# Patient Record
Sex: FEMALE | Race: WHITE | NOT HISPANIC OR LATINO | Employment: FULL TIME | URBAN - METROPOLITAN AREA
[De-identification: names, ages, dates, MRNs, and addresses within clinical notes are randomized per-mention and may not be internally consistent; named-entity substitution may affect disease eponyms.]

---

## 2017-11-07 ENCOUNTER — LAB (OUTPATIENT)
Dept: LAB | Facility: CLINIC | Age: 62
End: 2017-11-07
Payer: COMMERCIAL

## 2017-11-07 ENCOUNTER — TRANSCRIBE ORDERS (OUTPATIENT)
Dept: LAB | Facility: CLINIC | Age: 62
End: 2017-11-07

## 2017-11-07 DIAGNOSIS — E11.8 TYPE 2 DIABETES MELLITUS WITH COMPLICATION, WITHOUT LONG-TERM CURRENT USE OF INSULIN (HCC): Primary | ICD-10-CM

## 2017-11-07 LAB
CREAT UR-MCNC: 42.4 MG/DL
EST. AVERAGE GLUCOSE BLD GHB EST-MCNC: 151 MG/DL
HBA1C MFR BLD: 6.9 % (ref 4.2–6.3)
MICROALBUMIN UR-MCNC: <5 MG/L (ref 0–20)
MICROALBUMIN/CREAT 24H UR: <12 MG/G CREATININE (ref 0–30)

## 2017-11-07 PROCEDURE — 82570 ASSAY OF URINE CREATININE: CPT

## 2017-11-07 PROCEDURE — 82043 UR ALBUMIN QUANTITATIVE: CPT

## 2017-11-07 PROCEDURE — 36415 COLL VENOUS BLD VENIPUNCTURE: CPT

## 2017-11-07 PROCEDURE — 83036 HEMOGLOBIN GLYCOSYLATED A1C: CPT

## 2018-05-17 ENCOUNTER — APPOINTMENT (OUTPATIENT)
Dept: LAB | Facility: CLINIC | Age: 63
End: 2018-05-17
Payer: COMMERCIAL

## 2018-05-17 ENCOUNTER — TRANSCRIBE ORDERS (OUTPATIENT)
Dept: LAB | Facility: CLINIC | Age: 63
End: 2018-05-17

## 2018-05-17 DIAGNOSIS — E11.9 DIABETES MELLITUS, STABLE (HCC): Primary | ICD-10-CM

## 2018-05-17 LAB
ANION GAP SERPL CALCULATED.3IONS-SCNC: 6 MMOL/L (ref 4–13)
BUN SERPL-MCNC: 14 MG/DL (ref 5–25)
CALCIUM SERPL-MCNC: 8.9 MG/DL (ref 8.3–10.1)
CHLORIDE SERPL-SCNC: 107 MMOL/L (ref 100–108)
CO2 SERPL-SCNC: 28 MMOL/L (ref 21–32)
CREAT SERPL-MCNC: 0.87 MG/DL (ref 0.6–1.3)
EST. AVERAGE GLUCOSE BLD GHB EST-MCNC: 154 MG/DL
GFR SERPL CREATININE-BSD FRML MDRD: 71 ML/MIN/1.73SQ M
GLUCOSE P FAST SERPL-MCNC: 101 MG/DL (ref 65–99)
HBA1C MFR BLD: 7 % (ref 4.2–6.3)
POTASSIUM SERPL-SCNC: 4.2 MMOL/L (ref 3.5–5.3)
SODIUM SERPL-SCNC: 141 MMOL/L (ref 136–145)

## 2018-05-17 PROCEDURE — 80048 BASIC METABOLIC PNL TOTAL CA: CPT

## 2018-05-17 PROCEDURE — 83036 HEMOGLOBIN GLYCOSYLATED A1C: CPT

## 2018-05-17 PROCEDURE — 36415 COLL VENOUS BLD VENIPUNCTURE: CPT

## 2021-04-08 DIAGNOSIS — Z23 ENCOUNTER FOR IMMUNIZATION: ICD-10-CM

## 2021-12-14 ENCOUNTER — EVALUATION (OUTPATIENT)
Dept: PHYSICAL THERAPY | Facility: REHABILITATION | Age: 66
End: 2021-12-14
Payer: COMMERCIAL

## 2021-12-14 DIAGNOSIS — M54.16 LUMBAR RADICULOPATHY: Primary | ICD-10-CM

## 2021-12-14 PROCEDURE — 97161 PT EVAL LOW COMPLEX 20 MIN: CPT | Performed by: PHYSICAL THERAPIST

## 2021-12-14 RX ORDER — METOPROLOL SUCCINATE 100 MG/1
100 TABLET, EXTENDED RELEASE ORAL DAILY
COMMUNITY

## 2021-12-14 RX ORDER — GLYBURIDE 1.25 MG/1
1.25 TABLET ORAL
COMMUNITY

## 2021-12-22 ENCOUNTER — OFFICE VISIT (OUTPATIENT)
Dept: PHYSICAL THERAPY | Facility: REHABILITATION | Age: 66
End: 2021-12-22
Payer: COMMERCIAL

## 2021-12-22 DIAGNOSIS — M54.16 LUMBAR RADICULOPATHY: Primary | ICD-10-CM

## 2021-12-22 PROCEDURE — 97140 MANUAL THERAPY 1/> REGIONS: CPT | Performed by: PHYSICAL THERAPIST

## 2021-12-22 PROCEDURE — 97112 NEUROMUSCULAR REEDUCATION: CPT | Performed by: PHYSICAL THERAPIST

## 2021-12-22 PROCEDURE — 97110 THERAPEUTIC EXERCISES: CPT | Performed by: PHYSICAL THERAPIST

## 2021-12-30 ENCOUNTER — APPOINTMENT (OUTPATIENT)
Dept: PHYSICAL THERAPY | Facility: REHABILITATION | Age: 66
End: 2021-12-30
Payer: COMMERCIAL

## 2022-01-04 ENCOUNTER — OFFICE VISIT (OUTPATIENT)
Dept: PHYSICAL THERAPY | Facility: REHABILITATION | Age: 67
End: 2022-01-04
Payer: COMMERCIAL

## 2022-01-04 DIAGNOSIS — M54.16 LUMBAR RADICULOPATHY: Primary | ICD-10-CM

## 2022-01-04 PROCEDURE — 97140 MANUAL THERAPY 1/> REGIONS: CPT | Performed by: PHYSICAL THERAPIST

## 2022-01-04 PROCEDURE — 97112 NEUROMUSCULAR REEDUCATION: CPT | Performed by: PHYSICAL THERAPIST

## 2022-01-04 NOTE — PROGRESS NOTES
Daily Note     Today's date: 2022  Patient name: Michelle Platt  : 1955  MRN: [id number]  Referring provider: Saravanan Soliman DO  Dx:   Encounter Diagnosis     ICD-10-CM    1  Lumbar radiculopathy  M54 16        Start Time:   Stop Time:   Total time in clinic (min): 47 minutes    Subjective: Pt has been doing her stretches 3x/day and she is being more mindful of her position of her feet in relation to her back when moving  She feels like she is not very flexible  Pt reports one instance of symptoms down her leg when walking to her car after sitting at work  Her symptoms lasted 3 mins then went away  Pt reports some back pain yesterday and today  It is rated 2/10  Objective: See treatment diary below      Assessment: Tolerated treatment well  Patient exhibited good technique with therapeutic exercises  Pt presents with improved control with TAC during LE strengthening exercises  Pt also presents with decreased palpable muscle spasm throughout L superior glute duirng STM  Plan: Continue per plan of care              Precautions: Diabetes type 2, HTN  Daily Treatment Diary    Manuals        L piriformis/superior glute STM nv 10 mins 10 mins                                      Neuro Re-Ed          SLS           Tandem Stance          TAC  6 mins 10"x3       TAC + ball squeeze  5"x10 5"x10       TAC +alt knee fall out  x10 ea x10 ea       TAC + SLR nv  x10 ea       TAC + s/l ABD nv  x10 ea       TAC + bridge nv  x8 ea       TAC + prone heel squeeze          Ther Ex          Bike          Seated piri stretch 10"x1 ea 10"x3 ea HEP       Seated HS stretch 10"x1 ea 10"x3 ea HEP       Supine piri stretch 10"x1 ea 10"x3 ea HEP       LTR 10"x1 ea 5"x5 ea HEP       Standing hip ABD          Standing SLR          Standing march          HR/TR                              Ther Activity          FSU and over                     Gait Training                              Modalities

## 2022-01-06 ENCOUNTER — APPOINTMENT (OUTPATIENT)
Dept: PHYSICAL THERAPY | Facility: REHABILITATION | Age: 67
End: 2022-01-06
Payer: COMMERCIAL

## 2022-01-11 ENCOUNTER — APPOINTMENT (OUTPATIENT)
Dept: PHYSICAL THERAPY | Facility: REHABILITATION | Age: 67
End: 2022-01-11
Payer: COMMERCIAL

## 2022-01-14 ENCOUNTER — OFFICE VISIT (OUTPATIENT)
Dept: PHYSICAL THERAPY | Facility: REHABILITATION | Age: 67
End: 2022-01-14
Payer: COMMERCIAL

## 2022-01-14 DIAGNOSIS — M54.16 LUMBAR RADICULOPATHY: Primary | ICD-10-CM

## 2022-01-14 PROCEDURE — 97110 THERAPEUTIC EXERCISES: CPT | Performed by: PHYSICAL THERAPIST

## 2022-01-14 PROCEDURE — 97112 NEUROMUSCULAR REEDUCATION: CPT | Performed by: PHYSICAL THERAPIST

## 2022-01-14 NOTE — PROGRESS NOTES
Daily Note     Today's date: 2022  Patient name: Michelle Platt  : 1955  MRN: [id number]  Referring provider: Saravanan Soliman DO  Dx:   Encounter Diagnosis     ICD-10-CM    1  Lumbar radiculopathy  M54 16        Start Time:   Stop Time: 33  Total time in clinic (min): 47 minutes    Subjective: Pt reports she has has some back pain today  She had pain this morning, was able to do 1 5 mile walk without increase in pain  Pt reports yesterday she was working yesterday and was doing some more bending over, Pt had one day of symptoms down her LLE last week, lasted 6 mins, 2 episodes spaced 30 mins apart  She reports it started at the ankle and went to her hip  She felt like she had difficulty keeping her balance  She was walking when this happened  Back pain is rated 2/10 with no symptoms down her leg  Objective: See treatment diary below      Assessment: Tolerated treatment well  Patient demonstrated fatigue post treatment  Required increased UE support during standing hip 4 way when standing on LLE  Pt also required cueing for neutral standing posture with TAC during standing hip 4 way, pt most challenged when weightbearing on RLE requiring increased UE support to maintain balance and hip stability  Plan: Continue per plan of care              Precautions: Diabetes type 2, HTN  Daily Treatment Diary    Manuals       L piriformis/superior glute STM nv 10 mins 10 mins  10 mins                                    Neuro Re-Ed          SLS           Tandem Stance          Star tap          SLS with fwd lean          TAC  6 mins 10"x3 HEP      TAC + ball squeeze  5"x10 5"x10 HEP      TAC +alt knee fall out  x10 ea x10 ea HEP      TAC + SLR nv  x10 ea x10 ea      TAC + s/l ABD nv  x10 ea x10 ea      TAC + bridge nv  x8 ea x10 ea      TAC + prone heel squeeze          Standing TAC/Neutral standing     review      Ther Ex          Bike          Seated piri stretch 10"x1 ea 10"x3 ea HEP       Seated HS stretch 10"x1 ea 10"x3 ea HEP       Supine piri stretch 10"x1 ea 10"x3 ea HEP       LTR 10"x1 ea 5"x5 ea HEP       Standing hip ABD    1x10 ea      Standing SLR    1x10 ea      Standing march    1x10 ea      Standing hip ext    1x10 ea      HR/TR    1x10 ea      Side stepping          Monster walks          Ther Activity          Squatting          FSU and over                     Gait Training                              Modalities

## 2022-01-18 ENCOUNTER — APPOINTMENT (OUTPATIENT)
Dept: PHYSICAL THERAPY | Facility: REHABILITATION | Age: 67
End: 2022-01-18
Payer: COMMERCIAL

## 2022-01-21 ENCOUNTER — OFFICE VISIT (OUTPATIENT)
Dept: PHYSICAL THERAPY | Facility: REHABILITATION | Age: 67
End: 2022-01-21
Payer: COMMERCIAL

## 2022-01-21 DIAGNOSIS — M54.16 LUMBAR RADICULOPATHY: Primary | ICD-10-CM

## 2022-01-21 PROCEDURE — 97110 THERAPEUTIC EXERCISES: CPT | Performed by: PHYSICAL THERAPIST

## 2022-01-21 PROCEDURE — 97112 NEUROMUSCULAR REEDUCATION: CPT | Performed by: PHYSICAL THERAPIST

## 2022-01-21 NOTE — PROGRESS NOTES
Daily Note     Today's date: 2022  Patient name: Regan Morales  : 1955  MRN: [id number]  Referring provider: James Chambers DO  Dx:   Encounter Diagnosis     ICD-10-CM    1  Lumbar radiculopathy  M54 16        Start Time: 1110  Stop Time:   Total time in clinic (min): 41 minutes    Subjective: Pt reports her symptoms have been pretty good regarding symptoms going down her leg  Pt walked on Wednesday on the sidewalk  [place]  She had some pain during the walk but it went away quickly  She had pain the next day across her back  It is better today  Pain was achy in her back  Pt reports when she is walking, she is bent forward more  She is not sure how to maintain neutral l/s in that position  Objective: See treatment diary below  In standing, pt unable to isolate l/s mobility in APT/PPT      Assessment: Tolerated treatment well  Patient challenged with isolating pelvic motion for ant/post pelvic tilt, compensates with knee flexion and t/s motion  Trialed in quadruped however pt unable to isolate  On physioball, pt able to isolate pelvic ant/post tilts and find a neutral position  Performed in seated standing with ball on wall with knees flexed and extended and eventually in unsupported standing  Pt to incorporate neutral pelvis during ADL's as well as when walking  Plan: Continue per plan of care  Assess response to compliance with neutral pelvis awareness              Precautions: Diabetes type 2, HTN  Daily Treatment Diary    Manuals      L piriformis/superior glute STM nv 10 mins 10 mins  10 mins 4 mins                                   Neuro Re-Ed          SLS           Tandem Stance          Star tap          SLS with fwd lean          TAC  6 mins 10"x3 HEP      TAC + ball squeeze  5"x10 5"x10 HEP      TAC +alt knee fall out  x10 ea x10 ea HEP      TAC + SLR nv  x10 ea x10 ea HEP     TAC + s/l ABD nv  x10 ea x10 ea HEP     TAC + bridge nv  x8 ea x10 ea HEP     TAC + prone heel squeeze          Standing TAC/Neutral standing     review      APT/PPT/neutral spine in Qped, supine, sitting, standing with ball on wall, standing unsupported     25 mins     Ther Ex          Bike          Seated piri stretch 10"x1 ea 10"x3 ea HEP       Seated HS stretch 10"x1 ea 10"x3 ea HEP       Supine piri stretch 10"x1 ea 10"x3 ea HEP       LTR 10"x1 ea 5"x5 ea HEP       Seated fwd flexion     5"x5      Seated fwd flexion ball 3 way      5"x5      Standing hip ABD    1x10 ea HEP     Standing SLR    1x10 ea HEP     Standing march    1x10 ea HEP     Standing hip ext    1x10 ea HEP     HR/TR    1x10 ea HEP     Side stepping     1 lap     Monster walks     1 lap     Ther Activity          Squatting     2 mins review, 3"x10     FSU and over                     Gait Training                              Modalities

## 2022-01-25 ENCOUNTER — APPOINTMENT (OUTPATIENT)
Dept: PHYSICAL THERAPY | Facility: REHABILITATION | Age: 67
End: 2022-01-25
Payer: COMMERCIAL

## 2022-01-28 ENCOUNTER — OFFICE VISIT (OUTPATIENT)
Dept: PHYSICAL THERAPY | Facility: REHABILITATION | Age: 67
End: 2022-01-28
Payer: COMMERCIAL

## 2022-01-28 DIAGNOSIS — M54.16 LUMBAR RADICULOPATHY: Primary | ICD-10-CM

## 2022-01-28 PROCEDURE — 97140 MANUAL THERAPY 1/> REGIONS: CPT | Performed by: PHYSICAL THERAPIST

## 2022-01-28 NOTE — PROGRESS NOTES
PT Discharge    Today's date: 2022  Patient name: Arleen Kohli  : 1955  MRN: [id number]  Referring provider: Yari Rodrigues DO  Dx:   Encounter Diagnosis     ICD-10-CM    1  Lumbar radiculopathy  M54 16        Start Time:   Stop Time:   Total time in clinic (min): 23 minutes    Assessment  Assessment details: Pt is a 74year old female who presents to outpatient PT with a diagnosis of lumbar radiculopathy associated with L5 anterolisthesis  Pt presents with increased painfree lumbar ROM, increased proximal hip strength, resolution of pain, improved balance as well as overall improved function with standing  Pt still remains with minimal LE weakness but is able to complete all ADL's without difficulty  Pt is independent with HEP with use of visual aids and will benefit from d/c to independent HEP to continue to address above impairments and towards meeting LTG's  Impairments: abnormal or restricted ROM, activity intolerance, impaired balance, impaired physical strength, lacks appropriate home exercise program and pain with function  Understanding of Dx/Px/POC: good   Prognosis: good    Goals  Impairment Goals  - Pt will demonstrate decreased pain to 4/10 at worst - MET  - Pt will demonstrate painfree lumbar AROM - MET  - Pt will demonstrated increased LE strength all planes to >4+/5 b/l - PARTIALLY MET   Functional Goals  - Pt will be able to perform sit to stand transfers at work without increase in symptoms - MET  - Pt will be able to walk 5 miles without reproduction of symptoms for eventual return to competitive walking half marathon - PARTIALLY MET (pt has walked 4 miles symptom free)      Plan  Plan details: Pt d/c'ed from skilled PT     Patient would benefit from: skilled physical therapy  Planned modality interventions: low level laser therapy, thermotherapy: hydrocollator packs, TENS and cryotherapy  Planned therapy interventions: joint mobilization, manual therapy, patient education, strengthening, stretching, therapeutic activities, therapeutic exercise, home exercise program, flexibility, functional ROM exercises, balance and abdominal trunk stabilization  Treatment plan discussed with: patient        Subjective Evaluation    History of Present Illness  Mechanism of injury: Pt reports 97% improvement  Pt reports she is functionally able to do everything she wants without pain  She has not had pain in the past week  Pt denies symptoms in her leg    She is able to stand without symptoms  Pain  At best pain ratin  At worst pain ratin  Progression: no change    Social Support  Steps to enter house: yes  Stairs in house: no   Lives in: multiple-level home  Lives with: spouse    Employment status: working ( - works from home)    Diagnostic Tests  X-ray: abnormal  Treatments  Current treatment: physical therapy  Patient Goals  Patient goal: find out what exercises I can do to get rid of this - MET        Objective     Postural Observations  Standing posture: good        Tenderness     Additional Tenderness Details    (-) TTP L PSIS  (-) muscle spasm L superior glute/piriformis    Active Range of Motion     Lumbar   Flexion:  Restriction level: minimal  Extension:  Restriction level: minimal  Left lateral flexion:  WFL  Right lateral flexion:  WFL  Left rotation:  WFL  Right rotation:  [facility]    Strength/Myotome Testing     Left Hip   Planes of Motion   Flexion: 4+  Extension: 4+  Abduction: 4+  External rotation: 4+  Internal rotation: 4+    Right Hip   Planes of Motion   Flexion: 4+  Extension: 4  Abduction: 4+  External rotation: 4+  Internal rotation: 4+    Left Knee   Flexion: 4  Extension: 4+    Right Knee   Flexion: 4+  Extension: 4+    Left Ankle/Foot   Dorsiflexion: 4+    Right Ankle/Foot   Dorsiflexion: 4+    Muscle Activation     Additional Muscle Activation Details  Pt able to activate TAC and perform PPT    Tests     Lumbar   Negative SIJ compression and sacroiliac distraction  Left   Negative passive SLR and slump test      Right   Negative femoral stretch, passive SLR and slump test      Left Pelvic Girdle/Sacrum   Negative: thigh thrust      Right Pelvic Girdle/Sacrum   Negative: thigh thrust      Left Hip   Negative MARLENA and FADIR  Right Hip   Negative MARLENAAJNET and Jeison's       Additional Tests Details          General Comments:      Lumbar Comments  Able to HR/TR  Able to uni HR x1 b/l   SLS: 30 secs L, 30 secs R       Flowsheet Rows      Most Recent Value   PT/OT G-Codes    Current Score 94   Projected Score 98                 Precautions: Diabetes type 2, HTN  Daily Treatment Diary: Re-eval/measurements 23 mins

## 2022-11-08 ENCOUNTER — EVALUATION (OUTPATIENT)
Dept: PHYSICAL THERAPY | Facility: REHABILITATION | Age: 67
End: 2022-11-08

## 2022-11-08 DIAGNOSIS — M54.41 BILATERAL LOW BACK PAIN WITH BILATERAL SCIATICA, UNSPECIFIED CHRONICITY: Primary | ICD-10-CM

## 2022-11-08 DIAGNOSIS — M62.81 MUSCLE WEAKNESS: ICD-10-CM

## 2022-11-08 DIAGNOSIS — M25.559 HIP PAIN: ICD-10-CM

## 2022-11-08 DIAGNOSIS — M54.42 BILATERAL LOW BACK PAIN WITH BILATERAL SCIATICA, UNSPECIFIED CHRONICITY: Primary | ICD-10-CM

## 2022-11-08 NOTE — LETTER
2022    DO TRISTAN Ortiz/Maikel Chou Caogeovannalla    Patient: Vijay Ladd   YOB: 1955   Date of Visit: 2022     Encounter Diagnosis     ICD-10-CM    1  Bilateral low back pain with bilateral sciatica, unspecified chronicity  M54 42     M54 41    2  Hip pain  M25 559    3  Muscle weakness  M62 81        Dear Dr Cantrell Few: Thank you for your recent referral of Vijay Ladd  Please review the attached evaluation summary from Imelda's recent visit  Please verify that you agree with the plan of care by signing the attached order  If you have any questions or concerns, please do not hesitate to call  I sincerely appreciate the opportunity to share in the care of one of your patients and hope to have another opportunity to work with you in the near future  Sincerely,    Rochelle Lewis, PT      Referring Provider:      I certify that I have read the below Plan of Care and certify the need for these services furnished under this plan of treatment while under my care  DO Alejandra Ortiz [phone]  Via Fax: [phone]          PT Evaluation     Today's date: 2022  Patient name: Vijay Ladd  : 1955  MRN: [id number]  Referring provider: Lindsay García DO  Dx:   Encounter Diagnosis     ICD-10-CM    1  Bilateral low back pain with bilateral sciatica, unspecified chronicity  M54 42     M54 41    2  Hip pain  M25 559    3  Muscle weakness  M62 81        Start Time: 1400  Stop Time: 1448  Total time in clinic (min): 48 minutes    Assessment  Assessment details: Pt is a 69year old female who presents to outpatient PT with a diagnosis of b/l hip pain   Functional impairments include pain with prolonged walking, standing and when performing car transfers   Physical findings include L>R hip weakness, tenderness to palpation over L>R deep gluteal muscles, post pelvic floor and OI, pain with lumbar ext, and impaired balance  Patient would benefit from formal physical therapy to address impairments as detailed, decrease pain, and restore maximal level of function for all home, work, and mobility tasks  Thank you for this referral     Impairments: abnormal or restricted ROM, activity intolerance, impaired balance, impaired physical strength, lacks appropriate home exercise program and pain with function    Symptom irritability: lowUnderstanding of Dx/Px/POC: good   Prognosis: good    Goals  Impairment Goals  - Pt will demonstrate decreased pain to 6/10 at worst in LLE   - Pt will demonstrated increased LE strength all planes to >4/5 b/l  -Pt will be able to SLS x15 secs on L  Functional Goals  - Pt will achieve increased walking tolerance to 20 mins without increase in symptoms  - Pt will acheive increased standing tolerance to 20 mins without increase in symptoms      Plan  Plan details: Cont per POC  Patient would benefit from: skilled physical therapy  Planned modality interventions: cryotherapy and thermotherapy: hydrocollator packs  Planned therapy interventions: joint mobilization, manual therapy, neuromuscular re-education, patient education, strengthening, stretching, therapeutic activities, therapeutic exercise, abdominal trunk stabilization, balance, flexibility, functional ROM exercises and home exercise program  Frequency: 1x week  Duration in weeks: 6  Treatment plan discussed with: patient        Subjective Evaluation    History of Present Illness  Mechanism of injury: Sept 25th, 2022 pt drove 5 hours to [place] and when she got out of the car she noticed increase in pain in her L low back to L buttocks and down the back of her L LE into her day  It progressed that day to include her RLE as well She tried to do her stretches but it did not relieve her symptoms  She drove to [place] for a race and was not able to compete   She also had a work event and she was in a lot of pain  She took Advil and Tylenol which helped a little bit but she was noticing increased numbness  She needed help walking 2 blocks  She saw PCP who prescribed a high dose of Prednisone but pt could not tolerate it  They tried Robaxin which did not help  She was also having hip pain because she has previously gotten injections there before that have helped  She had a R hip injection 4 days ago and the L hip was injected 2 weeks ago  She reports her hip pain has improved but she still has symptoms in both legs but predominantly her L LE  In the past week, her symptoms have improved  Back pain: 0-3/10, L side  LLE pain from buttocks to toes posteriorly: 2-5/10  Pian will be in her foot and toes when walking and will be more in her buttocks when sitting  Pt is using a lumbar roll for her desk and that is helped  In Dec 2021, pt had x-rays showing Grade 1 anterolisthesis of L5, "L5 moves anteriorly when flexing"     Aggravating activities:   Walking 5 mins  Standing in line: 5 mins  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  Able to stand to do dishes, go up and down steps and dress without symptoms           Recurrent probem    Social Support  Lives in: multiple-level home  Lives with: spouse    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[employer])    Diagnostic Tests  No diagnostic tests performed  Treatments  Current treatment: physical therapy  Patient Goals  Patient goal: get rid of this pain  Objective     Tenderness     Lumbar Spine  Tenderness in the spinous process  Left Hip   Tenderness in the PSIS  Right Hip   Tenderness in the PSIS       Additional Tenderness Details  (+) TTP L4-L5 SP, dorsal sacral ligaments   TTP L>R piriformis, OI, post pelvic floor     Active Range of Motion     Lumbar   Flexion:  WFL  Extension:  with pain Restriction level: minimal  Left lateral flexion:  with pain Restriction level: minimal  Right lateral flexion:  WFL  Left rotation: Restriction level: minimal  Right rotation:  [facility]    Additional Active Range of Motion Details  Repeated lumbar ext in prone and standing increased back and L buttock pain   Repeated flexion in supine/stanidng/seated, no change, no worse       Strength/Myotome Testing     Left Hip   Planes of Motion   Flexion: 3  Extension: 3+  Abduction: 3+  External rotation: 4  Internal rotation: 4+    Right Hip   Planes of Motion   Flexion: 4+  Extension: 3+ (R low back pain)  Abduction: 4+  External rotation: 4+  Internal rotation: 4+    Left Knee   Flexion: 4  Extension: 4+    Right Knee   Flexion: 4+  Extension: 4+    Left Ankle/Foot   Dorsiflexion: 4+    Right Ankle/Foot   Dorsiflexion: 4+    Tests     Lumbar   Positive prone instability  and sacral spring   Left   Positive passive SLR  Negative slump test      Right   Negative passive SLR and slump test      Left Pelvic Girdle/Sacrum   Negative: thigh thrust      Right Pelvic Girdle/Sacrum   Negative: thigh thrust      Left Hip   Negative MARLENA and FADIR  Right Hip   Negative MARLENA and FADIR       Additional Tests Details  SLR (+) 47 deg L     General Comments:      Lumbar Comments  SLS: R 30 secs, L 3 secs   Slump test (-) b/l               Precautions: diabetes type 2, HTN  Impairments/functional limitations: low back pain, L>R LE radicular symptoms  Daily Treatment Diary    Manuals 11/8         L/s MARY haro                                       Neuro Re-Ed          SLS           Tandem Stance                    TAC Review, with exercises          TAC+SLR x5 ea         TAC+ s/l ABD          TAC+ bridge          TAC+ clamshells                              Ther Ex          Bike          LTR 5"x10         Supine piri stretch 30"x1 ea                   Standing hip ABD          Standing SLR          Standing march          HR/TR                              Ther Activity          FSU and over                     Gait Training Modalities

## 2022-11-08 NOTE — PROGRESS NOTES
PT Evaluation     Today's date: 2022  Patient name: Christopher Jim  : 1955  MRN: [id number]  Referring provider: Dara Gonzales DO  Dx:   Encounter Diagnosis     ICD-10-CM    1  Bilateral low back pain with bilateral sciatica, unspecified chronicity  M54 42     M54 41    2  Hip pain  M25 559    3  Muscle weakness  M62 81        Start Time: 1400  Stop Time: 1448  Total time in clinic (min): 48 minutes    Assessment  Assessment details: Pt is a 69year old female who presents to outpatient PT with a diagnosis of b/l hip pain   Functional impairments include pain with prolonged walking, standing and when performing car transfers  Physical findings include L>R hip weakness, tenderness to palpation over L>R deep gluteal muscles, post pelvic floor and OI, pain with lumbar ext, and impaired balance  Patient would benefit from formal physical therapy to address impairments as detailed, decrease pain, and restore maximal level of function for all home, work, and mobility tasks   Thank you for this referral     Impairments: abnormal or restricted ROM, activity intolerance, impaired balance, impaired physical strength, lacks appropriate home exercise program and pain with function    Symptom irritability: lowUnderstanding of Dx/Px/POC: good   Prognosis: good    Goals  Impairment Goals  - Pt will demonstrate decreased pain to 6/10 at worst in LLE   - Pt will demonstrated increased LE strength all planes to >4/5 b/l  -Pt will be able to SLS x15 secs on L  Functional Goals  - Pt will achieve increased walking tolerance to 20 mins without increase in symptoms  - Pt will acheive increased standing tolerance to 20 mins without increase in symptoms      Plan  Plan details: Cont per POC  Patient would benefit from: skilled physical therapy  Planned modality interventions: cryotherapy and thermotherapy: hydrocollator packs  Planned therapy interventions: joint mobilization, manual therapy, neuromuscular re-education, patient education, strengthening, stretching, therapeutic activities, therapeutic exercise, abdominal trunk stabilization, balance, flexibility, functional ROM exercises and home exercise program  Frequency: 1x week  Duration in weeks: 6  Treatment plan discussed with: patient        Subjective Evaluation    History of Present Illness  Mechanism of injury: Sept 25th, 2022 pt drove 5 hours to [place] and when she got out of the car she noticed increase in pain in her L low back to L buttocks and down the back of her L LE into her day  It progressed that day to include her RLE as well She tried to do her stretches but it did not relieve her symptoms  She drove to [place] for a race and was not able to compete  She also had a work event and she was in a lot of pain  She took Advil and Tylenol which helped a little bit but she was noticing increased numbness  She needed help walking 2 blocks  She saw PCP who prescribed a high dose of Prednisone but pt could not tolerate it  They tried Robaxin which did not help  She was also having hip pain because she has previously gotten injections there before that have helped  She had a R hip injection 4 days ago and the L hip was injected 2 weeks ago  She reports her hip pain has improved but she still has symptoms in both legs but predominantly her L LE  In the past week, her symptoms have improved  Back pain: 0-3/10, L side  LLE pain from buttocks to toes posteriorly: 2-5/10  Pian will be in her foot and toes when walking and will be more in her buttocks when sitting  Pt is using a lumbar roll for her desk and that is helped       In Dec 2021, pt had x-rays showing Grade 1 anterolisthesis of L5, "L5 moves anteriorly when flexing"     Aggravating activities:   Walking 5 mins  Standing in line: 5 mins  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  Able to stand to do dishes, go up and down steps and dress without symptoms           Recurrent probem    Social Support  Lives in: multiple-level home  Lives with: spouse    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[employer])    Diagnostic Tests  No diagnostic tests performed  Treatments  Current treatment: physical therapy  Patient Goals  Patient goal: get rid of this pain  Objective     Tenderness     Lumbar Spine  Tenderness in the spinous process  Left Hip   Tenderness in the PSIS  Right Hip   Tenderness in the PSIS  Additional Tenderness Details  (+) TTP L4-L5 SP, dorsal sacral ligaments   TTP L>R piriformis, OI, post pelvic floor     Active Range of Motion     Lumbar   Flexion:  WFL  Extension:  with pain Restriction level: minimal  Left lateral flexion:  with pain Restriction level: minimal  Right lateral flexion:  WFL  Left rotation:  Restriction level: minimal  Right rotation:  [facility]    Additional Active Range of Motion Details  Repeated lumbar ext in prone and standing increased back and L buttock pain   Repeated flexion in supine/stanidng/seated, no change, no worse       Strength/Myotome Testing     Left Hip   Planes of Motion   Flexion: 3  Extension: 3+  Abduction: 3+  External rotation: 4  Internal rotation: 4+    Right Hip   Planes of Motion   Flexion: 4+  Extension: 3+ (R low back pain)  Abduction: 4+  External rotation: 4+  Internal rotation: 4+    Left Knee   Flexion: 4  Extension: 4+    Right Knee   Flexion: 4+  Extension: 4+    Left Ankle/Foot   Dorsiflexion: 4+    Right Ankle/Foot   Dorsiflexion: 4+    Tests     Lumbar   Positive prone instability  and sacral spring   Left   Positive passive SLR  Negative slump test      Right   Negative passive SLR and slump test      Left Pelvic Girdle/Sacrum   Negative: thigh thrust      Right Pelvic Girdle/Sacrum   Negative: thigh thrust      Left Hip   Negative MARLENA and FADIR  Right Hip   Negative MARLENA and FADIR       Additional Tests Details  SLR (+) 47 deg L     General Comments:      Lumbar Comments  SLS: R 30 secs, L 3 secs   Slump test (-) b/l               Precautions: diabetes type 2, HTN  Impairments/functional limitations: low back pain, L>R LE radicular symptoms  Daily Treatment Diary    Manuals 11/8         L/s PA mobs  nv                                       Neuro Re-Ed          SLS           Tandem Stance                    TAC Review, with exercises          TAC+SLR x5 ea         TAC+ s/l ABD          TAC+ bridge          TAC+ clamshells                              Ther Ex          Bike          LTR 5"x10         Supine piri stretch 30"x1 ea                   Standing hip ABD          Standing SLR          Standing march          HR/TR                              Ther Activity          FSU and over                     Gait Training                              Modalities

## 2022-11-18 ENCOUNTER — OFFICE VISIT (OUTPATIENT)
Dept: PHYSICAL THERAPY | Facility: REHABILITATION | Age: 67
End: 2022-11-18

## 2022-11-18 DIAGNOSIS — M62.81 MUSCLE WEAKNESS: ICD-10-CM

## 2022-11-18 DIAGNOSIS — M54.41 BILATERAL LOW BACK PAIN WITH BILATERAL SCIATICA, UNSPECIFIED CHRONICITY: ICD-10-CM

## 2022-11-18 DIAGNOSIS — M54.42 BILATERAL LOW BACK PAIN WITH BILATERAL SCIATICA, UNSPECIFIED CHRONICITY: ICD-10-CM

## 2022-11-18 DIAGNOSIS — M25.559 HIP PAIN: Primary | ICD-10-CM

## 2022-11-18 NOTE — PROGRESS NOTES
Daily Note     Today's date: 2022  Patient name: Jerrica Gallegos  : 1955  MRN: [id number]  Referring provider: Alex Valencia DO  Dx:   Encounter Diagnosis     ICD-10-CM    1  Hip pain  M25 559       2  Bilateral low back pain with bilateral sciatica, unspecified chronicity  M54 42     M54 41       3  Muscle weakness  M62 81           Start Time: 1505  Stop Time: 1545  Total time in clinic (min): 40 minutes    Subjective: I have not had any symptoms down my leg all last week and [facility]  without a flare up  She was able to walk laps for 10 mins without pain  Yesterday she began having back pain on L side into L hip but no symptoms down LLE  Pt reports compliance with HEP  Back pain 5/10 in sitting, does not increase with activity  Objective: See treatment diary below      Assessment: Pt is progressing well with improvement in radicular symptoms with centralization of pain to low back  Pt presents with hypomobility throughout l/s with PA mobs and tenderness to palpation over L PSIS as well as tenderness over L piriformis  Progressed LE strengthening exercises with focus on pre-activation TAC to improve lumbar stability  Pt required vc/tc to decrease compensation with hip flexors during s/lABD on L  Tolerated treatment well  Patient would benefit from continued PT      Plan: Continue per plan of care          Precautions: diabetes type 2, HTN  Impairments/functional limitations: low back pain, L>R LE radicular symptoms  Daily Treatment Diary    Manuals         L/s PA mobs  nv 10 mins        L piriformis STM/TPR  10 mins                             Neuro Re-Ed          SLS           Tandem Stance                    TAC Review, with exercises          TAC+SLR x5 ea x10         TAC+ s/l ABD  x8 R, x6 L         TAC+ bridge  3"x10         TAC+ clamshells  3"x7 R, 3"x6 L                            Ther Ex          Bike          LTR 5"x10 5"x10         Supine piri stretch 30"x1 ea 30"x2 ea                  Standing hip ABD          Standing SLR          Standing march          HR/TR                              Ther Activity          FSU and over                     Gait Training                              Modalities

## 2022-11-21 ENCOUNTER — OFFICE VISIT (OUTPATIENT)
Dept: PHYSICAL THERAPY | Facility: REHABILITATION | Age: 67
End: 2022-11-21

## 2022-11-21 DIAGNOSIS — M54.42 BILATERAL LOW BACK PAIN WITH BILATERAL SCIATICA, UNSPECIFIED CHRONICITY: ICD-10-CM

## 2022-11-21 DIAGNOSIS — M62.81 MUSCLE WEAKNESS: ICD-10-CM

## 2022-11-21 DIAGNOSIS — M54.41 BILATERAL LOW BACK PAIN WITH BILATERAL SCIATICA, UNSPECIFIED CHRONICITY: ICD-10-CM

## 2022-11-21 DIAGNOSIS — M25.559 HIP PAIN: Primary | ICD-10-CM

## 2022-11-21 NOTE — PROGRESS NOTES
Daily Note     Today's date: 2022  Patient name: Karlos Tavarez  : 1955  MRN: [id number]  Referring provider: Boone Dias DO  Dx:   Encounter Diagnosis     ICD-10-CM    1  Hip pain  M25 559       2  Bilateral low back pain with bilateral sciatica, unspecified chronicity  M54 42     M54 41       3  Muscle weakness  M62 81           Start Time: 1234  Stop Time: 1317  Total time in clinic (min): 43 minutes    Subjective: The day after last visit she had increased back pain but no symptoms down the leg, improved by yesterday  Yesterday she was home binge watching TV and not quite as active yesterday  Pt went to bed ok but woke up this morning with low back pain and L ant hip pain  Pain 6/10  She did her exercises this morning which made her pain a little worse  Objective: See treatment diary below      Assessment: Pt performed seated lumbar flexion and supine SKC repetitively which caused a decrease in low back and radicular pain to L hip  Pt can perform repetitive flexion in sitting and supine to decrease low back pain and manage intermittent radicular symptoms  Reviewed LE strengthening exercises for correct form/technique  Pt did require cueing for starting position with clamshells and s/lhip ABD as well as cueing to minimize compensation with hip ADD during L SLR  Tolerated treatment well  Patient would benefit from continued PT      Plan: Continue per plan of care          Precautions: diabetes type 2, HTN  Impairments/functional limitations: low back pain, L>R LE radicular symptoms  Daily Treatment Diary    Manuals        L/s PA mobs  nv 10 mins 8 mins gr III       L piriformis STM/TPR  10 mins  12 mins                            Neuro Re-Ed          SLS           Tandem Stance                    TAC Review, with exercises          TAC+SLR x5 ea x10  x5 ea       TAC+ s/l ABD  x8 R, x6 L  x5 ea       TAC+ bridge  3"x10  x5        TAC+ clamshells  3"x7 R, 3"x6 L 3"x5 ea Ther Ex          Bike          LTR 5"x10 5"x10         Supine piri stretch 30"x1 ea 30"x2 ea        SKC   5"x10       Seated fwd flexion   x10, 3x       Standing hip ABD          Standing SLR          Standing march          HR/TR                              Ther Activity          FSU and over                     Gait Training                              Modalities

## 2022-12-01 ENCOUNTER — OFFICE VISIT (OUTPATIENT)
Dept: PHYSICAL THERAPY | Facility: REHABILITATION | Age: 67
End: 2022-12-01

## 2022-12-01 DIAGNOSIS — M54.42 BILATERAL LOW BACK PAIN WITH BILATERAL SCIATICA, UNSPECIFIED CHRONICITY: ICD-10-CM

## 2022-12-01 DIAGNOSIS — M25.559 HIP PAIN: Primary | ICD-10-CM

## 2022-12-01 DIAGNOSIS — M62.81 MUSCLE WEAKNESS: ICD-10-CM

## 2022-12-01 DIAGNOSIS — M54.41 BILATERAL LOW BACK PAIN WITH BILATERAL SCIATICA, UNSPECIFIED CHRONICITY: ICD-10-CM

## 2022-12-01 NOTE — PROGRESS NOTES
Daily Note     Today's date: 2022  Patient name: Rochelle Johansen  : 1955  MRN: [id number]  Referring provider: Arleen Hopkins DO  Dx:   Encounter Diagnosis     ICD-10-CM    1  Hip pain  M25 559       2  Bilateral low back pain with bilateral sciatica, unspecified chronicity  M54 42     M54 41       3  Muscle weakness  M62 81           Start Time: 1454  Stop Time: 1539  Total time in clinic (min): 45 minutes    Subjective: Pt reports she feels "not bad" today  She has been doing repetitive bending which has bene helping  She ha done instance of her leg going numb when she was standing at [facility]  It lasted under a minute  She does not have any pain right now  Objective: See treatment diary below      Assessment: Pt presents with good static balance and load transfer however challenged with addition of dynamic SLS with 3 way tap and fwd lean requiring use of placing her moving foot back on the ground to maintain stability  Pt also required vc/tc to avoid compensation with hip hike during standing hip ABD b/l  PT suggested  Pt use tennis ball on wall to perform self piriformis STM for 1-2 mins max, 1x/day  Pt will benefit from re-eval NV to determine progress towards LTG's and continued need for skilled PT  Tolerated treatment well  Patient would benefit from continued PT      Plan: Continue per plan of care  Progress note during next visit          Precautions: diabetes type 2, HTN  Impairments/functional limitations: low back pain, L>R LE radicular symptoms  Daily Treatment Diary    Manuals       L/s PA mobs  nv 10 mins 8 mins gr III 4 mins gr III      L piriformis STM/TPR  10 mins  12 mins  8 mins                          Neuro Re-Ed                                        TAC Review, with exercises          TAC+SLR x5 ea x10  x5 ea HEP      TAC+ s/l ABD  x8 R, x6 L  x5 ea HEP      TAC+ bridge  3"x10  x5  HEP      TAC+ clamshells  3"x7 R, 3"x6 L 3"x5 ea HEP      SLS 15"x1      SLS with fwd lean    x4 L, x5 R      3 way tap     x5 ea                Ther Ex          LTR 5"x10 5"x10   5"x10 ea      Supine piri stretch 30"x1 ea 30"x2 ea  30"x2 ea      SKC   5"x10 5"x10 ea      Seated fwd flexion   x10, 3x 5"x10      Standing hip ABD    x10 ea      Standing SLR    x10 ea      Standing hip ext    x10 ea      Wall hip ABD/ER harrison          Self STM ball on wall    1 min                Ther Activity          FSU and over                     Gait Training                              Modalities

## 2022-12-08 ENCOUNTER — EVALUATION (OUTPATIENT)
Dept: PHYSICAL THERAPY | Facility: REHABILITATION | Age: 67
End: 2022-12-08

## 2022-12-08 DIAGNOSIS — M54.42 BILATERAL LOW BACK PAIN WITH BILATERAL SCIATICA, UNSPECIFIED CHRONICITY: ICD-10-CM

## 2022-12-08 DIAGNOSIS — M25.559 HIP PAIN: Primary | ICD-10-CM

## 2022-12-08 DIAGNOSIS — M62.81 MUSCLE WEAKNESS: ICD-10-CM

## 2022-12-08 DIAGNOSIS — M54.41 BILATERAL LOW BACK PAIN WITH BILATERAL SCIATICA, UNSPECIFIED CHRONICITY: ICD-10-CM

## 2022-12-08 NOTE — PROGRESS NOTES
PT Re-Evaluation     Today's date: 2022  Patient name: Michelle Platt  : 1955  MRN: [id number]  Referring provider: Nic Posada DO  Dx:   Encounter Diagnosis     ICD-10-CM    1  Hip pain  M25 559       2  Bilateral low back pain with bilateral sciatica, unspecified chronicity  M54 42     M54 41       3  Muscle weakness  M62 81           Start Time: 1500  Stop Time: 1550  Total time in clinic (min): 50 minutes    Assessment  Assessment details: Pt is a 69year old female who presents to outpatient PT with a diagnosis of b/l hip pain   Pt presents with overall decrease in pain intensity and frequency, increased walking tolerance, increased proximal hip strength and improved balance as well as (-) neural tension  Pt still remains with decreased SLS tolerance on L compared to R, decreased L hip flexion and R hip ext strength measured by MMT, and (+) MARLENA on L  Pt had flare up of symptoms yesterday however transient  Updated HEP to address current presentation  Pt will benefit from 2 more visits of skilled PT to assure independence with HEP and evaluate symptoms response then d/c to HEP  If symptoms continue to worsen or become more frequent, pt will discuss with referring physician for further medical management     Impairments: abnormal or restricted ROM, activity intolerance, impaired balance, impaired physical strength, lacks appropriate home exercise program and pain with function    Symptom irritability: lowUnderstanding of Dx/Px/POC: good   Prognosis: good    Goals  Impairment Goals  - Pt will demonstrate decreased pain to 6/10 at worst in LLE - MET  - Pt will demonstrated increased LE strength all planes to >4/5 b/l - PARTIALLY MET   -Pt will be able to SLS x15 secs on L - MET  Functional Goals  - Pt will achieve increased walking tolerance to 20 mins without increase in symptoms - PARTIALLY MET  - Pt will acheive increased standing tolerance to 20 mins without increase in symptoms - PARTIALLY MET       Plan  Plan details: Cont per POC  Assess response to HEP  Patient would benefit from: skilled physical therapy  Planned modality interventions: cryotherapy and thermotherapy: hydrocollator packs  Planned therapy interventions: joint mobilization, manual therapy, neuromuscular re-education, patient education, strengthening, stretching, therapeutic activities, therapeutic exercise, abdominal trunk stabilization, balance, flexibility, functional ROM exercises and home exercise program  Duration in visits: 2  Duration in weeks: 4  Treatment plan discussed with: patient        Subjective Evaluation    History of Present Illness  Mechanism of injury: Pt reports this has been a stressful week for her at work  L low back pain into L leg all the way to her toes that started yesterday when she was standing doing dishes  She noticed the pain 5 mins into doing the dishes  Lasted about 5 mins  Relieved with self massae on wall with ball to L buttock  She was able to sleep without pain  Right now she does notice some discomfort in her L buttock while sitting into her L leg but relieving after a few mins  She does not recall any specific reasons to account for increased pain  Prior to yesterdays event, pt has not had symptoms for over a week  Was able to travel to [facility]  and return to walking without discomfort  Back pain: 0-3/10, L side  LLE pain from buttocks to toes posteriorly: 0-5/10, decreased frequency of pain since IE  One instance of pain in the past 2 weeks which occurred yesterday  Pain is noticeable in buttocks when sitting     In Dec 2021, pt had x-rays showing Grade 1 anterolisthesis of L5, "L5 moves anteriorly when flexing"     Aggravating activities:   Walking 10 mins  Standing in line: 5 mins  Standing to do dishes caused pain in 5 mins yesterday     Improved ability to perform car 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 driving without pain             Recurrent probem    Social Support  Lives in: multiple-level home  Lives with: spouse    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[employer])    Diagnostic Tests  No diagnostic tests performed  Treatments  Current treatment: physical therapy  Patient Goals  Patient goal: get rid of this pain  - PROGRESSING         Objective     Tenderness     Lumbar Spine  No tenderness in the spinous process  Left Hip   No tenderness in the PSIS  Right Hip   No tenderness in the PSIS  Additional Tenderness Details  (-) TTP L4-L5 SP, dorsal sacral ligaments, TTP L>R piriformis  (+) OI, post pelvic floor     Active Range of Motion     Lumbar   Flexion:  WFL  Extension:  WFL  Left lateral flexion:  WFL  Right lateral flexion:  WFL  Left rotation:  WFL  Right rotation:  [facility]    Strength/Myotome Testing     Left Hip   Planes of Motion   Flexion: 4-  Extension: 4  Abduction: 4+  External rotation: 4+  Internal rotation: 4+    Right Hip   Planes of Motion   Flexion: 4+  Extension: 3+ (R low back pain)  Abduction: 4+  External rotation: 4+  Internal rotation: 4+    Left Knee   Flexion: 4+  Extension: 4+    Right Knee   Flexion: 4+  Extension: 4+    Left Ankle/Foot   Dorsiflexion: 4+    Right Ankle/Foot   Dorsiflexion: 4+    Tests     Lumbar   Negative prone instability  and sacral spring   Left   Negative passive SLR and slump test      Right   Negative passive SLR and slump test      Left Pelvic Girdle/Sacrum   Negative: thigh thrust      Right Pelvic Girdle/Sacrum   Negative: thigh thrust      Left Hip   Positive MARLENA  Negative FADIR  Right Hip   Negative MARLENA and FADIR       Additional Tests Details  SLR (-)  MARLENA (+) L ant hip     General Comments:      Lumbar Comments  SLS: R 30 secs, L 17 secs   Slump test (-) b/l               Precautions: diabetes type 2, HTN  Impairments/functional limitations: low back pain, L>R LE radicular symptoms  Daily Treatment Diary    Manuals 11/8 11/18 11/21 12/1 12/8     L/s PA mobs  nv 10 mins 8 mins gr III 4 mins gr III      L piriformis STM/TPR  10 mins  12 mins  8 mins           35 mins     Re-eval          Neuro Re-Ed                                        TAC Review, with exercises          TAC+SLR x5 ea x10  x5 ea HEP 1x2, review for HEP     TAC+ s/l ABD  x8 R, x6 L  x5 ea HEP      TAC+ prone hip ext      1x10 ea with pillows under hips      TAC+ bridge  3"x10  x5  HEP      TAC+ clamshells  3"x7 R, 3"x6 L 3"x5 ea HEP      SLS     15"x1 Review, HEP     SLS with fwd lean    x4 L, x5 R x5ea     3 way tap     x5 ea x5 ea               Ther Ex          LTR 5"x10 5"x10   5"x10 ea      Supine piri stretch 30"x1 ea 30"x2 ea  30"x2 ea 30"x2 ea     SKC   5"x10 5"x10 ea      Seated fwd flexion   x10, 3x 5"x10      Standing hip ABD    x10 ea x10 ea     Standing SLR    x10 ea      Standing hip ext    x10 ea      Wall hip ABD/ER harrison          Self STM ball on wall    1 min HEP               Ther Activity          FSU and over                     Gait Training                              Modalities

## 2022-12-08 NOTE — LETTER
2022    DO TRISTAN Riddle/Maikel Chou Caonilla    Patient: Zachary Matta   YOB: 1955   Date of Visit: 2022     Encounter Diagnosis     ICD-10-CM    1  Hip pain  M25 559       2  Bilateral low back pain with bilateral sciatica, unspecified chronicity  M54 42     M54 41       3  Muscle weakness  M62 81           Dear Dr Lillian Sol: Thank you for your recent referral of Zachary Matta  Please review the attached evaluation summary from Imelda's recent visit  Please verify that you agree with the plan of care by signing the attached order  If you have any questions or concerns, please do not hesitate to call  I sincerely appreciate the opportunity to share in the care of one of your patients and hope to have another opportunity to work with you in the near future  Sincerely,    Danny Lorenzana, PT      Referring Provider:      I certify that I have read the below Plan of Care and certify the need for these services furnished under this plan of treatment while under my care  DO Alejandra Riddle[phone]  Via Fax: [phone]          PT Re-Evaluation     Today's date: 2022  Patient name: Zachary Matta  : 1955  MRN: [id number]  Referring provider: Veronika Orantes DO  Dx:   Encounter Diagnosis     ICD-10-CM    1  Hip pain  M25 559       2  Bilateral low back pain with bilateral sciatica, unspecified chronicity  M54 42     M54 41       3  Muscle weakness  M62 81           Start Time: 1500  Stop Time: 1550  Total time in clinic (min): 50 minutes    Assessment  Assessment details: Pt is a 69year old female who presents to outpatient PT with a diagnosis of b/l hip pain   Pt presents with overall decrease in pain intensity and frequency, increased walking tolerance, increased proximal hip strength and improved balance as well as (-) neural tension   Pt still remains with decreased SLS tolerance on L compared to R, decreased L hip flexion and R hip ext strength measured by MMT, and (+) MARLENA on L  Pt had flare up of symptoms yesterday however transient  Updated HEP to address current presentation  Pt will benefit from 2 more visits of skilled PT to assure independence with HEP and evaluate symptoms response then d/c to HEP  If symptoms continue to worsen or become more frequent, pt will discuss with referring physician for further medical management  Impairments: abnormal or restricted ROM, activity intolerance, impaired balance, impaired physical strength, lacks appropriate home exercise program and pain with function    Symptom irritability: lowUnderstanding of Dx/Px/POC: good   Prognosis: good    Goals  Impairment Goals  - Pt will demonstrate decreased pain to 6/10 at worst in LLE - MET  - Pt will demonstrated increased LE strength all planes to >4/5 b/l - PARTIALLY MET   -Pt will be able to SLS x15 secs on L - MET  Functional Goals  - Pt will achieve increased walking tolerance to 20 mins without increase in symptoms - PARTIALLY MET  - Pt will acheive increased standing tolerance to 20 mins without increase in symptoms - PARTIALLY MET       Plan  Plan details: Cont per POC  Assess response to HEP  Patient would benefit from: skilled physical therapy  Planned modality interventions: cryotherapy and thermotherapy: hydrocollator packs  Planned therapy interventions: joint mobilization, manual therapy, neuromuscular re-education, patient education, strengthening, stretching, therapeutic activities, therapeutic exercise, abdominal trunk stabilization, balance, flexibility, functional ROM exercises and home exercise program  Duration in visits: 2  Duration in weeks: 4  Treatment plan discussed with: patient        Subjective Evaluation    History of Present Illness  Mechanism of injury: Pt reports this has been a stressful week for her at work   L low back pain into L leg all the way to her toes that started yesterday when she was standing doing dishes  She noticed the pain 5 mins into doing the dishes  Lasted about 5 mins  Relieved with self massae on wall with ball to L buttock  She was able to sleep without pain  Right now she does notice some discomfort in her L buttock while sitting into her L leg but relieving after a few mins  She does not recall any specific reasons to account for increased pain  Prior to yesterdays event, pt has not had symptoms for over a week  Was able to travel to [facility]  and return to walking without discomfort  Back pain: 0-3/10, L side  LLE pain from buttocks to toes posteriorly: 0-5/10, decreased frequency of pain since IE  One instance of pain in the past 2 weeks which occurred yesterday  Pain is noticeable in buttocks when sitting  In Dec 2021, pt had x-rays showing Grade 1 anterolisthesis of L5, "L5 moves anteriorly when flexing"     Aggravating activities:   Walking 10 mins  Standing in line: 5 mins  Standing to do dishes caused pain in 5 mins yesterday     Improved ability to perform car 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 driving without pain             Recurrent probem    Social Support  Lives in: multiple-level home  Lives with: spouse    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[employer])    Diagnostic Tests  No diagnostic tests performed  Treatments  Current treatment: physical therapy  Patient Goals  Patient goal: get rid of this pain  - PROGRESSING         Objective     Tenderness     Lumbar Spine  No tenderness in the spinous process  Left Hip   No tenderness in the PSIS  Right Hip   No tenderness in the PSIS       Additional Tenderness Details  (-) TTP L4-L5 SP, dorsal sacral ligaments, TTP L>R piriformis  (+) OI, post pelvic floor     Active Range of Motion     Lumbar   Flexion:  WFL  Extension:  WFL  Left lateral flexion:  WFL  Right lateral flexion:  WFL  Left rotation:  WFL  Right rotation:  [facility]    Strength/Myotome Testing     Left Hip   Planes of Motion   Flexion: 4-  Extension: 4  Abduction: 4+  External rotation: 4+  Internal rotation: 4+    Right Hip   Planes of Motion   Flexion: 4+  Extension: 3+ (R low back pain)  Abduction: 4+  External rotation: 4+  Internal rotation: 4+    Left Knee   Flexion: 4+  Extension: 4+    Right Knee   Flexion: 4+  Extension: 4+    Left Ankle/Foot   Dorsiflexion: 4+    Right Ankle/Foot   Dorsiflexion: 4+    Tests     Lumbar   Negative prone instability  and sacral spring   Left   Negative passive SLR and slump test      Right   Negative passive SLR and slump test      Left Pelvic Girdle/Sacrum   Negative: thigh thrust      Right Pelvic Girdle/Sacrum   Negative: thigh thrust      Left Hip   Positive MARLENA  Negative FADIR  Right Hip   Negative MARLENA and FADIR       Additional Tests Details  SLR (-)  MARLENA (+) L ant hip     General Comments:      Lumbar Comments  SLS: R 30 secs, L 17 secs   Slump test (-) b/l              Precautions: diabetes type 2, HTN  Impairments/functional limitations: low back pain, L>R LE radicular symptoms  Daily Treatment Diary    Manuals 11/8 11/18 11/21 12/1 12/8     L/s PA mobs  nv 10 mins 8 mins gr III 4 mins gr III      L piriformis STM/TPR  10 mins  12 mins  8 mins           35 mins     Re-eval          Neuro Re-Ed                                        TAC Review, with exercises          TAC+SLR x5 ea x10  x5 ea HEP 1x2, review for HEP     TAC+ s/l ABD  x8 R, x6 L  x5 ea HEP      TAC+ prone hip ext      1x10 ea with pillows under hips      TAC+ bridge  3"x10  x5  HEP      TAC+ clamshells  3"x7 R, 3"x6 L 3"x5 ea HEP      SLS     15"x1 Review, HEP     SLS with fwd lean    x4 L, x5 R x5ea     3 way tap     x5 ea x5 ea               Ther Ex          LTR 5"x10 5"x10   5"x10 ea      Supine piri stretch 30"x1 ea 30"x2 ea  30"x2 ea 30"x2 ea     SKC   5"x10 5"x10 ea      Seated fwd flexion   x10, 3x 5"x10      Standing hip ABD    x10 ea x10 ea     Standing SLR x10 ea      Standing hip ext    x10 ea      Wall hip ABD/ER harrison          Self STM ball on wall    1 min HEP               Ther Activity          FSU and over                     Gait Training                              Modalities

## 2022-12-15 ENCOUNTER — APPOINTMENT (OUTPATIENT)
Dept: PHYSICAL THERAPY | Facility: REHABILITATION | Age: 67
End: 2022-12-15